# Patient Record
Sex: FEMALE | Race: WHITE | Employment: FULL TIME | URBAN - METROPOLITAN AREA
[De-identification: names, ages, dates, MRNs, and addresses within clinical notes are randomized per-mention and may not be internally consistent; named-entity substitution may affect disease eponyms.]

---

## 2024-10-04 ENCOUNTER — HOSPITAL ENCOUNTER (OUTPATIENT)
Facility: HOSPITAL | Age: 29
Discharge: HOME | End: 2024-10-07
Payer: OTHER GOVERNMENT

## 2024-10-04 VITALS
WEIGHT: 139.99 LBS | BODY MASS INDEX: 22.5 KG/M2 | OXYGEN SATURATION: 100 % | TEMPERATURE: 98.4 F | HEIGHT: 66 IN | HEART RATE: 81 BPM

## 2024-10-04 LAB
ALBUMIN SERPL-MCNC: 4.1 G/DL (ref 3.5–5)
ALBUMIN/GLOB SERPL: 1.5 (ref 1.1–2.2)
ALP SERPL-CCNC: 74 U/L (ref 45–117)
ALT SERPL-CCNC: 34 U/L (ref 12–78)
ANION GAP SERPL CALC-SCNC: 3 MMOL/L (ref 2–12)
AST SERPL-CCNC: 17 U/L (ref 15–37)
BASOPHILS # BLD: 0.1 K/UL (ref 0–0.1)
BASOPHILS NFR BLD: 1 % (ref 0–1)
BILIRUB SERPL-MCNC: 0.6 MG/DL (ref 0.2–1)
BUN SERPL-MCNC: 21 MG/DL (ref 6–20)
BUN/CREAT SERPL: 32 (ref 12–20)
CALCIUM SERPL-MCNC: 8.9 MG/DL (ref 8.5–10.1)
CHLORIDE SERPL-SCNC: 107 MMOL/L (ref 97–108)
CO2 SERPL-SCNC: 28 MMOL/L (ref 21–32)
CREAT SERPL-MCNC: 0.66 MG/DL (ref 0.55–1.02)
DIFFERENTIAL METHOD BLD: NORMAL
EOSINOPHIL # BLD: 0.2 K/UL (ref 0–0.4)
EOSINOPHIL NFR BLD: 2 % (ref 0–7)
ERYTHROCYTE [DISTWIDTH] IN BLOOD BY AUTOMATED COUNT: 12 % (ref 11.5–14.5)
EST. AVERAGE GLUCOSE BLD GHB EST-MCNC: 91 MG/DL
GLOBULIN SER CALC-MCNC: 2.8 G/DL (ref 2–4)
GLUCOSE SERPL-MCNC: 91 MG/DL (ref 65–100)
HBA1C MFR BLD: 4.8 % (ref 4–5.6)
HCT VFR BLD AUTO: 38.7 % (ref 35–47)
HGB BLD-MCNC: 13 G/DL (ref 11.5–16)
IMM GRANULOCYTES # BLD AUTO: 0 K/UL (ref 0–0.04)
IMM GRANULOCYTES NFR BLD AUTO: 0 % (ref 0–0.5)
LYMPHOCYTES # BLD: 2.3 K/UL (ref 0.8–3.5)
LYMPHOCYTES NFR BLD: 24 % (ref 12–49)
MCH RBC QN AUTO: 29.2 PG (ref 26–34)
MCHC RBC AUTO-ENTMCNC: 33.6 G/DL (ref 30–36.5)
MCV RBC AUTO: 87 FL (ref 80–99)
MONOCYTES # BLD: 0.6 K/UL (ref 0–1)
MONOCYTES NFR BLD: 6 % (ref 5–13)
NEUTS SEG # BLD: 6.4 K/UL (ref 1.8–8)
NEUTS SEG NFR BLD: 67 % (ref 32–75)
NRBC # BLD: 0 K/UL (ref 0–0.01)
NRBC BLD-RTO: 0 PER 100 WBC
PLATELET # BLD AUTO: 217 K/UL (ref 150–400)
PMV BLD AUTO: 11.3 FL (ref 8.9–12.9)
POTASSIUM SERPL-SCNC: 3.8 MMOL/L (ref 3.5–5.1)
PROT SERPL-MCNC: 6.9 G/DL (ref 6.4–8.2)
RBC # BLD AUTO: 4.45 M/UL (ref 3.8–5.2)
SODIUM SERPL-SCNC: 138 MMOL/L (ref 136–145)
WBC # BLD AUTO: 9.5 K/UL (ref 3.6–11)

## 2024-10-04 PROCEDURE — 36415 COLL VENOUS BLD VENIPUNCTURE: CPT

## 2024-10-04 PROCEDURE — 85025 COMPLETE CBC W/AUTO DIFF WBC: CPT

## 2024-10-04 PROCEDURE — 83036 HEMOGLOBIN GLYCOSYLATED A1C: CPT

## 2024-10-04 PROCEDURE — 80053 COMPREHEN METABOLIC PANEL: CPT

## 2024-10-04 RX ORDER — ACETAMINOPHEN 500 MG
500 TABLET ORAL EVERY 6 HOURS PRN
COMMUNITY

## 2024-10-04 RX ORDER — FLUVOXAMINE MALEATE 50 MG
50 TABLET ORAL NIGHTLY
COMMUNITY

## 2024-10-04 RX ORDER — HYOSCYAMINE SULFATE 0.125 MG
0.12 TABLET,DISINTEGRATING ORAL DAILY
COMMUNITY

## 2024-10-04 RX ORDER — GABAPENTIN 300 MG/1
300 CAPSULE ORAL 3 TIMES DAILY
COMMUNITY

## 2024-10-04 RX ORDER — BUPROPION HYDROCHLORIDE 100 MG/1
100 TABLET ORAL DAILY
COMMUNITY

## 2024-10-04 RX ORDER — IBUPROFEN 200 MG
200 TABLET ORAL EVERY 6 HOURS PRN
COMMUNITY

## 2024-10-04 RX ORDER — SPIRONOLACTONE 100 MG/1
200 TABLET, FILM COATED ORAL
COMMUNITY

## 2024-10-04 ASSESSMENT — PAIN DESCRIPTION - DESCRIPTORS: DESCRIPTORS: SHOOTING

## 2024-10-04 ASSESSMENT — PAIN DESCRIPTION - ORIENTATION: ORIENTATION: LOWER

## 2024-10-04 ASSESSMENT — PAIN SCALES - GENERAL: PAINLEVEL_OUTOF10: 6

## 2024-10-04 ASSESSMENT — PAIN DESCRIPTION - LOCATION: LOCATION: LEG

## 2024-10-04 NOTE — PERIOP NOTE
[address] [address]   MAIN OR                                  [phone]    MAIN PRE OP             [phone]                                                                                AMBULATORY PRE OP          [phone]  PRE-ADMISSION TESTING    [phone]     Surgery Date:  10/8/24       Is surgery arrival time given by surgeon? NO    If “NO”, [facility] staff will call you between 4 and 7pm the day before your surgery with your arrival time. (If your surgery is on a Monday, we will call you the Friday before.)    Call [phone] after 7pm Monday-Friday if you did not receive this call.    INSTRUCTIONS BEFORE YOUR SURGERY   When You  Arrive Arrive at [facility] Patient Access on 1st floor the day of your surgery.   Medications to   TAKE   Morning of Surgery MEDICATIONS TO TAKE THE MORNING OF SURGERY WITH A SIP OF WATER: WELLBUTRIN, GABAPENTIN    You may take these medications, IF NEEDED, the morning of surgery: NONE  Ask your surgeon/prescribing doctor for instructions on taking or stopping these medications prior to surgery: NONE   Medications to STOP  before surgery Non-Steroidal anti-inflammatory Drugs (NSAID's): for example, Diclofenac (Voltaren), Ibuprofen (Advil, Motrin), Naproxen (Aleve) 3 days  STOP all herbal supplements and vitamins(unless prescribed by your doctor), and fish oil for 7 days  Other: NONE  (Pain medications not listed above, including Tylenol may be taken up until 4 hours prior to arrival time)   Blood  Thinners If you take Aspirin, Plavix, Coumadin, or any blood-thinning or anti-blood clot medicine, talk to the doctor who prescribed the medications for pre-operative instructions.  If you take aspirin or aspirin containing products for pain, stop 7 days prior to surgery   Going Home - or Spending the Night OUTPATIENT SURGERY: You must have a responsible adult drive you home and stay with you 24 hours  information.   CHG may cause dry skin, but should not cause redness, rash or intense itching. If you suspect an allergic reaction, use your own soap for the morning of surgery and report reaction to the        pre-operative nurse.      Shower instructions  Shower the night before and the morning of surgery using these instructions:  1. Wash your hair using your normal shampoo and rinse.  2. Wash your face and genital area using your own soap and rinse.  3. Turn off the shower water and pour half of the bottle of CHG onto a clean washcloth.  4. Wash your body from neck down to toes. Pay special attention to your surgical site.  5. Do not shave near the surgical site.  6. Turn shower water back on and rinse off.  7. Pat dry with a clean towel, sleep in clean clothes and clean sheets.  8. Repeat these steps the morning of surgery.    Do NOT use any powder, deodorant, lotion/cream/oil, perfume/aftershave, cosmetics or alcohol-based skin or hair products after showering.    Do NOT shave your surgical site less than four days prior to surgery.    Brush your teeth and rinse with water before coming to the hospital.       Tips To Help Prevent Infections After Your Surgery   Wash your hands frequently.   Keep your bandage clean and dry.   Do not touch your surgical site.   Use clean towels and washcloths.   Wear clean clothes and use clean bed linens.   Do not allow pets near your surgical wound.   Quit smoking to help wound healing.   Manage blood sugar levels if you have diabetes. Poorly managed blood sugar levels slow down wound healing.      How We Keep You Safe During Surgery And Work To Prevent Surgical Site Infections  1. Caregivers will check your ID bracelet multiple times.  2. All caregivers will clean their hands before touching you.  3. Caregivers will verify the procedure and surgical site multiple times with you. Do not priscila your surgical site unless instructed to by your surgeon.  4. Your surgical site will be

## 2024-10-05 LAB
BACTERIA SPEC CULT: NORMAL
BACTERIA SPEC CULT: NORMAL
SERVICE CMNT-IMP: NORMAL

## 2024-10-07 ENCOUNTER — ANESTHESIA EVENT (OUTPATIENT)
Facility: HOSPITAL | Age: 29
End: 2024-10-07
Payer: OTHER GOVERNMENT

## 2024-10-07 RX ORDER — MUPIROCIN 20 MG/G
OINTMENT TOPICAL 2 TIMES DAILY
Status: CANCELLED | OUTPATIENT
Start: 2024-10-07 | End: 2024-10-12

## 2024-10-07 NOTE — PERIOP NOTE
PAT Nurse Practitioner   Pre-Operative Chart Review/Assessment:-ORTHOPEDIC/NEUROSURGICAL SPINE                Patient Name:  Natali Lares                                                           Age:   28 y.o.    :  1995     Today's Date:  10/7/2024     Date of PAT:   10/4/24      Date of Surgery:    10/8/24      Procedure(s):  L4 - L5 LAMINECTOMY AND DISCECTOMY; L5 - S1 LAMINOTOMY AND DISCECTOMY      Surgeon:   Dr. Moran                       PLAN:       1)  PCP: Althea Pires DO        2)  Cardiac Clearance: Not requested.       3)  Diabetic Treatment Consult: Not indicated. A1c-4.8       4)  Sleep Apnea evaluation:  Not indicated. MARY Score 0.       5)  Treatment for MRSA/Staph Aureus: +MSSA. Tx w/ mupirocin(ordered for inpt)       6)   Discharge Plan: Home w/ spouse       7)  Skin: Denies open wounds, cuts, sores, rashes or other areas of concern in PAT assessment.       8)  Additional Concerns: OCD, fibromyalgia     Additional Orders for Day of Surgery:  Urine HCG      Records Scanned in Epic:   None              Vital Signs:         Vitals:    10/04/24 1358   Pulse: 81   Temp: 98.4 °F (36.9 °C)   SpO2: 100%           Body mass index is 22.6 kg/m².       ____________________________________________  PAST MEDICAL HISTORY  Past Medical History:   Diagnosis Date    Fibromyalgia     OCD (obsessive compulsive disorder)       ____________________________________________  PAST SURGICAL HISTORY  Past Surgical History:   Procedure Laterality Date    CHOLECYSTECTOMY  2019    DENTAL SURGERY  2020    SEPTORHINOPLASTY      X2.   AND 2024     ____________________________________________  HOME MEDICATIONS    Current Outpatient Medications   Medication Sig    gabapentin (NEURONTIN) 300 MG capsule Take 1 capsule by mouth 3 times daily. Max Daily Amount: 900 mg    acetaminophen (TYLENOL) 500 MG tablet Take 1 tablet by mouth every 6 hours as needed for Pain    ibuprofen (ADVIL;MOTRIN) 200 MG tablet Take 1

## 2024-10-08 ENCOUNTER — APPOINTMENT (OUTPATIENT)
Facility: HOSPITAL | Age: 29
End: 2024-10-08
Attending: NEUROLOGICAL SURGERY
Payer: OTHER GOVERNMENT

## 2024-10-08 ENCOUNTER — HOSPITAL ENCOUNTER (OUTPATIENT)
Facility: HOSPITAL | Age: 29
Setting detail: OBSERVATION
LOS: 1 days | Discharge: HOME | End: 2024-10-09
Attending: NEUROLOGICAL SURGERY | Admitting: NEUROLOGICAL SURGERY
Payer: OTHER GOVERNMENT

## 2024-10-08 ENCOUNTER — ANESTHESIA (OUTPATIENT)
Facility: HOSPITAL | Age: 29
End: 2024-10-08
Payer: OTHER GOVERNMENT

## 2024-10-08 DIAGNOSIS — Z98.890 S/P LAMINECTOMY: Primary | ICD-10-CM

## 2024-10-08 PROBLEM — M51.26 HERNIATED LUMBAR INTERVERTEBRAL DISC: Status: ACTIVE | Noted: 2024-10-08

## 2024-10-08 LAB
ABO + RH BLD: NORMAL
BLOOD GROUP ANTIBODIES SERPL: NORMAL
HCG UR QL: NEGATIVE
SPECIMEN EXP DATE BLD: NORMAL

## 2024-10-08 PROCEDURE — 86850 RBC ANTIBODY SCREEN: CPT

## 2024-10-08 PROCEDURE — G0378 HOSPITAL OBSERVATION PER HR: HCPCS

## 2024-10-08 PROCEDURE — 3700000001 HC ADD 15 MINUTES (ANESTHESIA): Performed by: NEUROLOGICAL SURGERY

## 2024-10-08 PROCEDURE — 7100000000 HC PACU RECOVERY - FIRST 15 MIN: Performed by: NEUROLOGICAL SURGERY

## 2024-10-08 PROCEDURE — 6360000002 HC RX W HCPCS: Performed by: NURSE ANESTHETIST, CERTIFIED REGISTERED

## 2024-10-08 PROCEDURE — 3600000014 HC SURGERY LEVEL 4 ADDTL 15MIN: Performed by: NEUROLOGICAL SURGERY

## 2024-10-08 PROCEDURE — 6370000000 HC RX 637 (ALT 250 FOR IP): Performed by: NEUROLOGICAL SURGERY

## 2024-10-08 PROCEDURE — 6370000000 HC RX 637 (ALT 250 FOR IP): Performed by: ANESTHESIOLOGY

## 2024-10-08 PROCEDURE — 2580000003 HC RX 258: Performed by: NEUROLOGICAL SURGERY

## 2024-10-08 PROCEDURE — 2580000003 HC RX 258: Performed by: ANESTHESIOLOGY

## 2024-10-08 PROCEDURE — 6360000002 HC RX W HCPCS: Performed by: NEUROLOGICAL SURGERY

## 2024-10-08 PROCEDURE — 7100000001 HC PACU RECOVERY - ADDTL 15 MIN: Performed by: NEUROLOGICAL SURGERY

## 2024-10-08 PROCEDURE — 2500000003 HC RX 250 WO HCPCS: Performed by: NEUROLOGICAL SURGERY

## 2024-10-08 PROCEDURE — 2709999900 HC NON-CHARGEABLE SUPPLY: Performed by: NEUROLOGICAL SURGERY

## 2024-10-08 PROCEDURE — 2720000010 HC SURG SUPPLY STERILE: Performed by: NEUROLOGICAL SURGERY

## 2024-10-08 PROCEDURE — 99024 POSTOP FOLLOW-UP VISIT: CPT | Performed by: PHYSICIAN ASSISTANT

## 2024-10-08 PROCEDURE — 97530 THERAPEUTIC ACTIVITIES: CPT

## 2024-10-08 PROCEDURE — 86901 BLOOD TYPING SEROLOGIC RH(D): CPT

## 2024-10-08 PROCEDURE — 6370000000 HC RX 637 (ALT 250 FOR IP): Performed by: PHYSICIAN ASSISTANT

## 2024-10-08 PROCEDURE — 2580000003 HC RX 258: Performed by: NURSE ANESTHETIST, CERTIFIED REGISTERED

## 2024-10-08 PROCEDURE — 2500000003 HC RX 250 WO HCPCS: Performed by: NURSE ANESTHETIST, CERTIFIED REGISTERED

## 2024-10-08 PROCEDURE — 81025 URINE PREGNANCY TEST: CPT

## 2024-10-08 PROCEDURE — C1729 CATH, DRAINAGE: HCPCS | Performed by: NEUROLOGICAL SURGERY

## 2024-10-08 PROCEDURE — 3600000004 HC SURGERY LEVEL 4 BASE: Performed by: NEUROLOGICAL SURGERY

## 2024-10-08 PROCEDURE — 97161 PT EVAL LOW COMPLEX 20 MIN: CPT

## 2024-10-08 PROCEDURE — 3700000000 HC ANESTHESIA ATTENDED CARE: Performed by: NEUROLOGICAL SURGERY

## 2024-10-08 PROCEDURE — 36415 COLL VENOUS BLD VENIPUNCTURE: CPT

## 2024-10-08 PROCEDURE — 86900 BLOOD TYPING SEROLOGIC ABO: CPT

## 2024-10-08 PROCEDURE — C1713 ANCHOR/SCREW BN/BN,TIS/BN: HCPCS | Performed by: NEUROLOGICAL SURGERY

## 2024-10-08 RX ORDER — HYDROMORPHONE HYDROCHLORIDE 2 MG/1
1 TABLET ORAL EVERY 4 HOURS PRN
Status: DISCONTINUED | OUTPATIENT
Start: 2024-10-08 | End: 2024-10-09 | Stop reason: HOSPADM

## 2024-10-08 RX ORDER — HYDRALAZINE HYDROCHLORIDE 20 MG/ML
10 INJECTION INTRAMUSCULAR; INTRAVENOUS ONCE
Status: DISCONTINUED | OUTPATIENT
Start: 2024-10-08 | End: 2024-10-08 | Stop reason: HOSPADM

## 2024-10-08 RX ORDER — HYDROMORPHONE HYDROCHLORIDE 2 MG/1
2 TABLET ORAL EVERY 4 HOURS PRN
Status: DISCONTINUED | OUTPATIENT
Start: 2024-10-08 | End: 2024-10-09 | Stop reason: HOSPADM

## 2024-10-08 RX ORDER — HYDROMORPHONE HYDROCHLORIDE 2 MG/ML
INJECTION, SOLUTION INTRAMUSCULAR; INTRAVENOUS; SUBCUTANEOUS
Status: DISCONTINUED | OUTPATIENT
Start: 2024-10-08 | End: 2024-10-08 | Stop reason: SDUPTHER

## 2024-10-08 RX ORDER — MIDAZOLAM HYDROCHLORIDE 1 MG/ML
INJECTION INTRAMUSCULAR; INTRAVENOUS
Status: DISCONTINUED | OUTPATIENT
Start: 2024-10-08 | End: 2024-10-08 | Stop reason: SDUPTHER

## 2024-10-08 RX ORDER — SPIRONOLACTONE 100 MG/1
200 TABLET, FILM COATED ORAL
Status: DISCONTINUED | OUTPATIENT
Start: 2024-10-08 | End: 2024-10-09 | Stop reason: HOSPADM

## 2024-10-08 RX ORDER — ONDANSETRON 2 MG/ML
4 INJECTION INTRAMUSCULAR; INTRAVENOUS EVERY 6 HOURS PRN
Status: DISCONTINUED | OUTPATIENT
Start: 2024-10-08 | End: 2024-10-08 | Stop reason: SDUPTHER

## 2024-10-08 RX ORDER — LIDOCAINE HYDROCHLORIDE 20 MG/ML
INJECTION, SOLUTION EPIDURAL; INFILTRATION; INTRACAUDAL; PERINEURAL
Status: DISCONTINUED | OUTPATIENT
Start: 2024-10-08 | End: 2024-10-08 | Stop reason: SDUPTHER

## 2024-10-08 RX ORDER — ONDANSETRON 2 MG/ML
4 INJECTION INTRAMUSCULAR; INTRAVENOUS EVERY 6 HOURS PRN
Status: DISCONTINUED | OUTPATIENT
Start: 2024-10-08 | End: 2024-10-09 | Stop reason: HOSPADM

## 2024-10-08 RX ORDER — ONDANSETRON 2 MG/ML
4 INJECTION INTRAMUSCULAR; INTRAVENOUS
Status: DISCONTINUED | OUTPATIENT
Start: 2024-10-08 | End: 2024-10-08 | Stop reason: HOSPADM

## 2024-10-08 RX ORDER — SODIUM CHLORIDE, SODIUM LACTATE, POTASSIUM CHLORIDE, CALCIUM CHLORIDE 600; 310; 30; 20 MG/100ML; MG/100ML; MG/100ML; MG/100ML
INJECTION, SOLUTION INTRAVENOUS
Status: DISCONTINUED | OUTPATIENT
Start: 2024-10-08 | End: 2024-10-08 | Stop reason: SDUPTHER

## 2024-10-08 RX ORDER — SODIUM CHLORIDE 9 MG/ML
INJECTION, SOLUTION INTRAVENOUS PRN
Status: DISCONTINUED | OUTPATIENT
Start: 2024-10-08 | End: 2024-10-08

## 2024-10-08 RX ORDER — ACETAMINOPHEN 325 MG/1
650 TABLET ORAL EVERY 6 HOURS
Status: DISCONTINUED | OUTPATIENT
Start: 2024-10-08 | End: 2024-10-08 | Stop reason: SDUPTHER

## 2024-10-08 RX ORDER — POLYETHYLENE GLYCOL 3350 17 G/17G
17 POWDER, FOR SOLUTION ORAL DAILY PRN
Status: DISCONTINUED | OUTPATIENT
Start: 2024-10-08 | End: 2024-10-09 | Stop reason: HOSPADM

## 2024-10-08 RX ORDER — DIPHENHYDRAMINE HYDROCHLORIDE 50 MG/ML
25 INJECTION INTRAMUSCULAR; INTRAVENOUS EVERY 6 HOURS PRN
Status: DISCONTINUED | OUTPATIENT
Start: 2024-10-08 | End: 2024-10-09 | Stop reason: HOSPADM

## 2024-10-08 RX ORDER — LIDOCAINE HYDROCHLORIDE AND EPINEPHRINE 10; 10 MG/ML; UG/ML
INJECTION, SOLUTION INFILTRATION; PERINEURAL PRN
Status: DISCONTINUED | OUTPATIENT
Start: 2024-10-08 | End: 2024-10-08 | Stop reason: HOSPADM

## 2024-10-08 RX ORDER — BISACODYL 10 MG
10 SUPPOSITORY, RECTAL RECTAL DAILY PRN
Status: DISCONTINUED | OUTPATIENT
Start: 2024-10-08 | End: 2024-10-09 | Stop reason: HOSPADM

## 2024-10-08 RX ORDER — LIDOCAINE HYDROCHLORIDE 10 MG/ML
1 INJECTION, SOLUTION EPIDURAL; INFILTRATION; INTRACAUDAL; PERINEURAL
Status: DISCONTINUED | OUTPATIENT
Start: 2024-10-08 | End: 2024-10-08 | Stop reason: HOSPADM

## 2024-10-08 RX ORDER — FENTANYL CITRATE 50 UG/ML
100 INJECTION, SOLUTION INTRAMUSCULAR; INTRAVENOUS
Status: DISCONTINUED | OUTPATIENT
Start: 2024-10-08 | End: 2024-10-08 | Stop reason: HOSPADM

## 2024-10-08 RX ORDER — EPHEDRINE SULFATE 50 MG/ML
INJECTION INTRAVENOUS
Status: DISCONTINUED | OUTPATIENT
Start: 2024-10-08 | End: 2024-10-08 | Stop reason: SDUPTHER

## 2024-10-08 RX ORDER — SODIUM CHLORIDE 0.9 % (FLUSH) 0.9 %
5-40 SYRINGE (ML) INJECTION EVERY 12 HOURS SCHEDULED
Status: DISCONTINUED | OUTPATIENT
Start: 2024-10-08 | End: 2024-10-09 | Stop reason: HOSPADM

## 2024-10-08 RX ORDER — ACETAMINOPHEN 500 MG
1000 TABLET ORAL ONCE
Status: COMPLETED | OUTPATIENT
Start: 2024-10-08 | End: 2024-10-08

## 2024-10-08 RX ORDER — MUPIROCIN 20 MG/G
OINTMENT TOPICAL 2 TIMES DAILY
Status: DISCONTINUED | OUTPATIENT
Start: 2024-10-08 | End: 2024-10-09 | Stop reason: HOSPADM

## 2024-10-08 RX ORDER — METHOCARBAMOL 750 MG/1
750 TABLET, FILM COATED ORAL EVERY 8 HOURS PRN
Status: DISCONTINUED | OUTPATIENT
Start: 2024-10-08 | End: 2024-10-09 | Stop reason: HOSPADM

## 2024-10-08 RX ORDER — HYDROMORPHONE HYDROCHLORIDE 1 MG/ML
0.5 INJECTION, SOLUTION INTRAMUSCULAR; INTRAVENOUS; SUBCUTANEOUS EVERY 5 MIN PRN
Status: DISCONTINUED | OUTPATIENT
Start: 2024-10-08 | End: 2024-10-08 | Stop reason: HOSPADM

## 2024-10-08 RX ORDER — SODIUM CHLORIDE 9 MG/ML
INJECTION, SOLUTION INTRAVENOUS PRN
Status: DISCONTINUED | OUTPATIENT
Start: 2024-10-08 | End: 2024-10-09 | Stop reason: HOSPADM

## 2024-10-08 RX ORDER — SODIUM CHLORIDE 0.9 % (FLUSH) 0.9 %
5-40 SYRINGE (ML) INJECTION PRN
Status: DISCONTINUED | OUTPATIENT
Start: 2024-10-08 | End: 2024-10-09 | Stop reason: HOSPADM

## 2024-10-08 RX ORDER — PROCHLORPERAZINE EDISYLATE 5 MG/ML
5 INJECTION INTRAMUSCULAR; INTRAVENOUS
Status: DISCONTINUED | OUTPATIENT
Start: 2024-10-08 | End: 2024-10-08 | Stop reason: HOSPADM

## 2024-10-08 RX ORDER — SODIUM CHLORIDE 0.9 % (FLUSH) 0.9 %
5-40 SYRINGE (ML) INJECTION PRN
Status: DISCONTINUED | OUTPATIENT
Start: 2024-10-08 | End: 2024-10-08 | Stop reason: HOSPADM

## 2024-10-08 RX ORDER — SENNA AND DOCUSATE SODIUM 50; 8.6 MG/1; MG/1
1 TABLET, FILM COATED ORAL 2 TIMES DAILY
Status: DISCONTINUED | OUTPATIENT
Start: 2024-10-08 | End: 2024-10-09 | Stop reason: HOSPADM

## 2024-10-08 RX ORDER — OXYCODONE HYDROCHLORIDE 5 MG/1
5 TABLET ORAL
Status: DISCONTINUED | OUTPATIENT
Start: 2024-10-08 | End: 2024-10-08 | Stop reason: HOSPADM

## 2024-10-08 RX ORDER — ONDANSETRON 4 MG/1
4 TABLET, ORALLY DISINTEGRATING ORAL EVERY 8 HOURS PRN
Status: DISCONTINUED | OUTPATIENT
Start: 2024-10-08 | End: 2024-10-09 | Stop reason: HOSPADM

## 2024-10-08 RX ORDER — OXYCODONE HYDROCHLORIDE 5 MG/1
5 TABLET ORAL EVERY 4 HOURS PRN
Status: DISCONTINUED | OUTPATIENT
Start: 2024-10-08 | End: 2024-10-09 | Stop reason: HOSPADM

## 2024-10-08 RX ORDER — NALOXONE HYDROCHLORIDE 0.4 MG/ML
INJECTION, SOLUTION INTRAMUSCULAR; INTRAVENOUS; SUBCUTANEOUS PRN
Status: DISCONTINUED | OUTPATIENT
Start: 2024-10-08 | End: 2024-10-08

## 2024-10-08 RX ORDER — SODIUM CHLORIDE 0.9 % (FLUSH) 0.9 %
5-40 SYRINGE (ML) INJECTION PRN
Status: DISCONTINUED | OUTPATIENT
Start: 2024-10-08 | End: 2024-10-08

## 2024-10-08 RX ORDER — SODIUM CHLORIDE 0.9 % (FLUSH) 0.9 %
5-40 SYRINGE (ML) INJECTION EVERY 12 HOURS SCHEDULED
Status: DISCONTINUED | OUTPATIENT
Start: 2024-10-08 | End: 2024-10-08

## 2024-10-08 RX ORDER — MIDAZOLAM HYDROCHLORIDE 2 MG/2ML
2 INJECTION, SOLUTION INTRAMUSCULAR; INTRAVENOUS PRN
Status: DISCONTINUED | OUTPATIENT
Start: 2024-10-08 | End: 2024-10-08 | Stop reason: HOSPADM

## 2024-10-08 RX ORDER — ONDANSETRON 2 MG/ML
INJECTION INTRAMUSCULAR; INTRAVENOUS
Status: DISCONTINUED | OUTPATIENT
Start: 2024-10-08 | End: 2024-10-08 | Stop reason: SDUPTHER

## 2024-10-08 RX ORDER — BUPROPION HYDROCHLORIDE 100 MG/1
100 TABLET, EXTENDED RELEASE ORAL DAILY
Status: DISCONTINUED | OUTPATIENT
Start: 2024-10-09 | End: 2024-10-09 | Stop reason: HOSPADM

## 2024-10-08 RX ORDER — ROCURONIUM BROMIDE 10 MG/ML
INJECTION, SOLUTION INTRAVENOUS
Status: DISCONTINUED | OUTPATIENT
Start: 2024-10-08 | End: 2024-10-08 | Stop reason: SDUPTHER

## 2024-10-08 RX ORDER — SODIUM CHLORIDE, SODIUM LACTATE, POTASSIUM CHLORIDE, CALCIUM CHLORIDE 600; 310; 30; 20 MG/100ML; MG/100ML; MG/100ML; MG/100ML
INJECTION, SOLUTION INTRAVENOUS CONTINUOUS
Status: DISCONTINUED | OUTPATIENT
Start: 2024-10-08 | End: 2024-10-08 | Stop reason: HOSPADM

## 2024-10-08 RX ORDER — FENTANYL CITRATE 50 UG/ML
INJECTION, SOLUTION INTRAMUSCULAR; INTRAVENOUS
Status: DISCONTINUED | OUTPATIENT
Start: 2024-10-08 | End: 2024-10-08 | Stop reason: SDUPTHER

## 2024-10-08 RX ORDER — DEXAMETHASONE SODIUM PHOSPHATE 4 MG/ML
INJECTION, SOLUTION INTRA-ARTICULAR; INTRALESIONAL; INTRAMUSCULAR; INTRAVENOUS; SOFT TISSUE
Status: DISCONTINUED | OUTPATIENT
Start: 2024-10-08 | End: 2024-10-08 | Stop reason: SDUPTHER

## 2024-10-08 RX ORDER — SODIUM CHLORIDE 9 MG/ML
INJECTION, SOLUTION INTRAVENOUS CONTINUOUS
Status: DISCONTINUED | OUTPATIENT
Start: 2024-10-08 | End: 2024-10-09 | Stop reason: HOSPADM

## 2024-10-08 RX ORDER — ONDANSETRON 4 MG/1
4 TABLET, ORALLY DISINTEGRATING ORAL EVERY 8 HOURS PRN
Status: DISCONTINUED | OUTPATIENT
Start: 2024-10-08 | End: 2024-10-08 | Stop reason: SDUPTHER

## 2024-10-08 RX ORDER — SODIUM CHLORIDE 0.9 % (FLUSH) 0.9 %
5-40 SYRINGE (ML) INJECTION EVERY 12 HOURS SCHEDULED
Status: DISCONTINUED | OUTPATIENT
Start: 2024-10-08 | End: 2024-10-08 | Stop reason: HOSPADM

## 2024-10-08 RX ORDER — FLUVOXAMINE MALEATE 50 MG
50 TABLET ORAL NIGHTLY
Status: DISCONTINUED | OUTPATIENT
Start: 2024-10-08 | End: 2024-10-09 | Stop reason: HOSPADM

## 2024-10-08 RX ORDER — HYDROXYZINE HYDROCHLORIDE 10 MG/1
10 TABLET, FILM COATED ORAL EVERY 8 HOURS PRN
Status: DISCONTINUED | OUTPATIENT
Start: 2024-10-08 | End: 2024-10-09 | Stop reason: HOSPADM

## 2024-10-08 RX ORDER — ACETAMINOPHEN 325 MG/1
650 TABLET ORAL EVERY 6 HOURS
Status: DISCONTINUED | OUTPATIENT
Start: 2024-10-08 | End: 2024-10-09 | Stop reason: HOSPADM

## 2024-10-08 RX ORDER — GABAPENTIN 300 MG/1
300 CAPSULE ORAL 3 TIMES DAILY
Status: DISCONTINUED | OUTPATIENT
Start: 2024-10-08 | End: 2024-10-09 | Stop reason: HOSPADM

## 2024-10-08 RX ORDER — SUCCINYLCHOLINE/SOD CL,ISO/PF 200MG/10ML
SYRINGE (ML) INTRAVENOUS
Status: DISCONTINUED | OUTPATIENT
Start: 2024-10-08 | End: 2024-10-08 | Stop reason: SDUPTHER

## 2024-10-08 RX ORDER — SODIUM CHLORIDE 9 MG/ML
INJECTION, SOLUTION INTRAVENOUS PRN
Status: DISCONTINUED | OUTPATIENT
Start: 2024-10-08 | End: 2024-10-08 | Stop reason: HOSPADM

## 2024-10-08 RX ORDER — FENTANYL CITRATE 50 UG/ML
25 INJECTION, SOLUTION INTRAMUSCULAR; INTRAVENOUS EVERY 5 MIN PRN
Status: DISCONTINUED | OUTPATIENT
Start: 2024-10-08 | End: 2024-10-08 | Stop reason: HOSPADM

## 2024-10-08 RX ADMIN — ROCURONIUM BROMIDE 5 MG: 10 INJECTION, SOLUTION INTRAVENOUS at 08:13

## 2024-10-08 RX ADMIN — ACETAMINOPHEN 650 MG: 325 TABLET ORAL at 14:15

## 2024-10-08 RX ADMIN — ACETAMINOPHEN 1000 MG: 500 TABLET ORAL at 07:16

## 2024-10-08 RX ADMIN — ROCURONIUM BROMIDE 35 MG: 10 INJECTION, SOLUTION INTRAVENOUS at 08:22

## 2024-10-08 RX ADMIN — SODIUM CHLORIDE, POTASSIUM CHLORIDE, SODIUM LACTATE AND CALCIUM CHLORIDE: 600; 310; 30; 20 INJECTION, SOLUTION INTRAVENOUS at 08:02

## 2024-10-08 RX ADMIN — WATER 2000 MG: 1 INJECTION INTRAMUSCULAR; INTRAVENOUS; SUBCUTANEOUS at 08:26

## 2024-10-08 RX ADMIN — DEXAMETHASONE SODIUM PHOSPHATE 8 MG: 4 INJECTION INTRA-ARTICULAR; INTRALESIONAL; INTRAMUSCULAR; INTRAVENOUS; SOFT TISSUE at 08:32

## 2024-10-08 RX ADMIN — OXYCODONE 5 MG: 5 TABLET ORAL at 21:30

## 2024-10-08 RX ADMIN — FLUVOXAMINE MALEATE 50 MG: 50 TABLET, COATED ORAL at 22:30

## 2024-10-08 RX ADMIN — PROPOFOL 140 MG: 10 INJECTION, EMULSION INTRAVENOUS at 08:14

## 2024-10-08 RX ADMIN — ONDANSETRON 4 MG: 2 INJECTION INTRAMUSCULAR; INTRAVENOUS at 09:38

## 2024-10-08 RX ADMIN — SENNOSIDES AND DOCUSATE SODIUM 1 TABLET: 50; 8.6 TABLET ORAL at 22:30

## 2024-10-08 RX ADMIN — GABAPENTIN 300 MG: 300 CAPSULE ORAL at 22:29

## 2024-10-08 RX ADMIN — FENTANYL CITRATE 50 MCG: 50 INJECTION, SOLUTION INTRAMUSCULAR; INTRAVENOUS at 08:14

## 2024-10-08 RX ADMIN — MIDAZOLAM 2 MG: 1 INJECTION INTRAMUSCULAR; INTRAVENOUS at 08:08

## 2024-10-08 RX ADMIN — EPHEDRINE SULFATE 10 MG: 50 INJECTION INTRAVENOUS at 09:19

## 2024-10-08 RX ADMIN — Medication 120 MG: at 08:14

## 2024-10-08 RX ADMIN — SODIUM CHLORIDE: 9 INJECTION, SOLUTION INTRAVENOUS at 22:26

## 2024-10-08 RX ADMIN — OXYCODONE 5 MG: 5 TABLET ORAL at 16:49

## 2024-10-08 RX ADMIN — WATER 2000 MG: 1 INJECTION INTRAMUSCULAR; INTRAVENOUS; SUBCUTANEOUS at 16:20

## 2024-10-08 RX ADMIN — FENTANYL CITRATE 50 MCG: 50 INJECTION, SOLUTION INTRAMUSCULAR; INTRAVENOUS at 08:37

## 2024-10-08 RX ADMIN — SENNOSIDES AND DOCUSATE SODIUM 1 TABLET: 50; 8.6 TABLET ORAL at 14:15

## 2024-10-08 RX ADMIN — SODIUM CHLORIDE, POTASSIUM CHLORIDE, SODIUM LACTATE AND CALCIUM CHLORIDE: 600; 310; 30; 20 INJECTION, SOLUTION INTRAVENOUS at 09:36

## 2024-10-08 RX ADMIN — HYDROMORPHONE HYDROCHLORIDE 1 MG: 2 INJECTION, SOLUTION INTRAMUSCULAR; INTRAVENOUS; SUBCUTANEOUS at 09:14

## 2024-10-08 RX ADMIN — MIDAZOLAM 3 MG: 1 INJECTION INTRAMUSCULAR; INTRAVENOUS at 08:06

## 2024-10-08 RX ADMIN — EPHEDRINE SULFATE 10 MG: 50 INJECTION INTRAVENOUS at 09:40

## 2024-10-08 RX ADMIN — SODIUM CHLORIDE, PRESERVATIVE FREE 10 ML: 5 INJECTION INTRAVENOUS at 22:31

## 2024-10-08 RX ADMIN — SODIUM CHLORIDE, POTASSIUM CHLORIDE, SODIUM LACTATE AND CALCIUM CHLORIDE: 600; 310; 30; 20 INJECTION, SOLUTION INTRAVENOUS at 07:27

## 2024-10-08 RX ADMIN — SUGAMMADEX 200 MG: 100 INJECTION, SOLUTION INTRAVENOUS at 09:54

## 2024-10-08 RX ADMIN — LIDOCAINE HYDROCHLORIDE 60 MG: 20 INJECTION, SOLUTION EPIDURAL; INFILTRATION; INTRACAUDAL; PERINEURAL at 08:14

## 2024-10-08 RX ADMIN — ACETAMINOPHEN 650 MG: 325 TABLET ORAL at 22:30

## 2024-10-08 RX ADMIN — HYDROMORPHONE HYDROCHLORIDE 0.5 MG: 2 INJECTION, SOLUTION INTRAMUSCULAR; INTRAVENOUS; SUBCUTANEOUS at 10:06

## 2024-10-08 RX ADMIN — HYOSCYAMINE SULFATE 0.12 MG: 0.12 TABLET SUBLINGUAL at 14:15

## 2024-10-08 RX ADMIN — SODIUM CHLORIDE: 9 INJECTION, SOLUTION INTRAVENOUS at 14:14

## 2024-10-08 RX ADMIN — HYDROMORPHONE HYDROCHLORIDE 0.5 MG: 2 INJECTION, SOLUTION INTRAMUSCULAR; INTRAVENOUS; SUBCUTANEOUS at 10:14

## 2024-10-08 ASSESSMENT — PAIN SCALES - GENERAL
PAINLEVEL_OUTOF10: 4
PAINLEVEL_OUTOF10: 6
PAINLEVEL_OUTOF10: 6

## 2024-10-08 ASSESSMENT — PAIN - FUNCTIONAL ASSESSMENT
PAIN_FUNCTIONAL_ASSESSMENT: 0-10
PAIN_FUNCTIONAL_ASSESSMENT: ACTIVITIES ARE NOT PREVENTED

## 2024-10-08 ASSESSMENT — PAIN DESCRIPTION - ORIENTATION
ORIENTATION: POSTERIOR;LOWER
ORIENTATION: POSTERIOR

## 2024-10-08 ASSESSMENT — PAIN DESCRIPTION - DESCRIPTORS
DESCRIPTORS: STABBING
DESCRIPTORS: ACHING
DESCRIPTORS: SORE

## 2024-10-08 ASSESSMENT — PAIN DESCRIPTION - LOCATION
LOCATION: BACK

## 2024-10-08 NOTE — PERIOP NOTE
TRANSFER - OUT REPORT:    Verbal report given to JENNIFER Martinez(name) on Natali Lares  being transferred to 545(unit) for routine post-op       Report consisted of patient’s Situation, Background, Assessment and   Recommendations(SBAR).     Time Pre op antibiotic given:0826  Anesthesia Stop time: 1019    Information from the following report(s) SBAR, OR Summary, Procedure Summary, Intake/Output, MAR, and Cardiac Rhythm NSR/Sinus Tachy  was reviewed with the receiving nurse.    Opportunity for questions and clarification was provided.     Is the patient on 02? No       L/Min 0       Other n/a    Is the patient on a monitor? No    Is the nurse transporting with the patient? No    At transfer, are there Patient Belongings with the patient?  If Yes, please note/list:    Surgical Waiting Area notified of patient's transfer from PACU? Yes

## 2024-10-08 NOTE — OP NOTE
[address]  [address]                            OPERATIVE REPORT      PATIENT NAME: SHARLENE WINKLER                 : 1995  MED REC NO: [id number]                       ROOM: OR  ACCOUNT NO: [id number]                       ADMIT DATE: 10/08/2024  PROVIDER: Maria Esther Moran MD    DATE OF SERVICE:  10/08/2024    PREOPERATIVE DIAGNOSES:  Large herniated nucleus pulposus at L4-5, herniated nucleus pulposus, left L5-S1.    POSTOPERATIVE DIAGNOSES:  Large herniated nucleus pulposus at L4-5, herniated nucleus pulposus, left L5-S1.    PROCEDURES PERFORMED:  L4-5 laminectomy and diskectomy using operating microscope, L5-S1 laminotomy and diskectomy on the left using operating microscope.    SURGEON:  Maria Esther Moran MD    ASSISTANT:  Davide Machado.    ANESTHESIA:  General.    ESTIMATED BLOOD LOSS:  30 mL.    SPECIMENS REMOVED:  None.    INTRAOPERATIVE FINDINGS:  Very large disk herniation at L4-5 causing bilateral compression, good decompression centrally and traversing nerve roots.  When I finished, a smaller disk herniation causing significant left S1 nerve compression at the L5-S1 level.  Good decompression when I finished.     COMPLICATIONS:  None.    IMPLANTS:  None.    INDICATIONS:  This is a 28-year-old woman, who has had increasing back pain and bilateral lower extremity pain.  When I first saw her, it was predominantly on the left side and just checking on the right.  She had significant weakness, tibialis anterior, gastrocnemius, and EHL, particularly on the left.  Her symptoms have progressed over the course of the past several weeks to include severe pain bilaterally with significant weakness.  She cannot stand or sit for any significant time and MRI showed a very large central disk herniation at L4-5.  She did not have any signs of cauda equina syndrome with the bowel and bladder intact and no perineal numbness.  Risks and benefits

## 2024-10-08 NOTE — BRIEF OP NOTE
Brief Postoperative Note      Patient: Natali Lares  YOB: 1995  MRN: [id number]    Date of Procedure: 10/8/2024    Pre-Op Diagnosis Codes:      * Herniated nucleus pulposus, L4-5 [M51.26]     * Herniated nucleus pulposus, L5-S1 [M51.27]    Post-Op Diagnosis: Same       Procedure(s):  L4 - L5 LAMINECTOMY AND DISCECTOMY; L5 - S1 LAMINOTOMY AND DISCECTOMY    Surgeon(s):  Maria Esther Moran MD    Assistant:  Surgical Assistant: Davide Machado    Anesthesia: General    Estimated Blood Loss (mL): 30cc    Complications: None    Specimens:   * No specimens in log *    Implants:  * No implants in log *      Drains:   Closed/Suction Drain Medial Back Accordion (Active)       Findings:  Infection Present At Time Of Surgery (PATOS) (choose all levels that have infection present):  No infection present  Other Findings: large disc herniation at L4/5, good decompression of traversing nerve roots, smaller disc herniation compression L5/S1 level.  Good decompression when I finished.      Electronically signed by Maria Esther Moran MD on 10/8/2024 at 10:15 AM

## 2024-10-08 NOTE — ANESTHESIA PRE PROCEDURE
01:59 PM    RDW 12.0 10/04/2024 01:59 PM     10/04/2024 01:59 PM       CMP:   Lab Results   Component Value Date/Time     10/04/2024 01:59 PM    K 3.8 10/04/2024 01:59 PM     10/04/2024 01:59 PM    CO2 28 10/04/2024 01:59 PM    BUN 21 10/04/2024 01:59 PM    CREATININE 0.66 10/04/2024 01:59 PM    LABGLOM >90 10/04/2024 01:59 PM    GLUCOSE 91 10/04/2024 01:59 PM    CALCIUM 8.9 10/04/2024 01:59 PM    BILITOT 0.6 10/04/2024 01:59 PM    ALKPHOS 74 10/04/2024 01:59 PM    AST 17 10/04/2024 01:59 PM    ALT 34 10/04/2024 01:59 PM       POC Tests: No results for input(s): \"POCGLU\", \"POCNA\", \"POCK\", \"POCCL\", \"POCBUN\", \"POCHEMO\", \"POCHCT\" in the last 72 hours.    Coags: No results found for: \"PROTIME\", \"INR\", \"APTT\"    HCG (If Applicable):   Lab Results   Component Value Date    PREGTESTUR Negative 10/08/2024        ABGs: No results found for: \"PHART\", \"PO2ART\", \"ARY4LMW\", \"OVI6CCX\", \"BEART\", \"S6RMDQUO\"     Type & Screen (If Applicable):  No results found for: \"ABORH\", \"LABANTI\"    Drug/Infectious Status (If Applicable):  No results found for: \"HIV\", \"HEPCAB\"    COVID-19 Screening (If Applicable): No results found for: \"COVID19\"        Anesthesia Evaluation  Patient summary reviewed and Nursing notes reviewed  Airway: Mallampati: II  TM distance: >3 FB   Neck ROM: full  Mouth opening: > = 3 FB   Dental: normal exam         Pulmonary:Negative Pulmonary ROS breath sounds clear to auscultation                             Cardiovascular:Negative CV ROS  Exercise tolerance: good (>4 METS)          Rhythm: regular  Rate: normal                    Neuro/Psych:   (+) psychiatric history (OCD):             ROS comment: Fibromyalgia GI/Hepatic/Renal: Neg GI/Hepatic/Renal ROS            Endo/Other: Negative Endo/Other ROS                    Abdominal:             Vascular: negative vascular ROS.         Other Findings:         Anesthesia Plan      general     ASA 2       Induction: intravenous.    MIPS: Postoperative

## 2024-10-08 NOTE — ANESTHESIA POSTPROCEDURE EVALUATION
Department of Anesthesiology  Postprocedure Note    Patient: Natali Lares  MRN: [id number]  YOB: 1995  Date of evaluation: 10/8/2024    Procedure Summary       Date: 10/08/24 Room / Location: [facility] MAIN [address] MAIN OR    Anesthesia Start: 0802 Anesthesia Stop: 1026    Procedure: L4 - L5 LAMINECTOMY AND DISCECTOMY; L5 - S1 LAMINOTOMY AND DISCECTOMY (Spine Lumbar) Diagnosis:       Herniated nucleus pulposus, L4-5      Herniated nucleus pulposus, L5-S1      (Herniated nucleus pulposus, L4-5 [M51.26])      (Herniated nucleus pulposus, L5-S1 [M51.27])    Providers: Maria Esther Moran MD Responsible Provider: Dawood Stockton MD    Anesthesia Type: General ASA Status: 2            Anesthesia Type: General    Rashard Phase I: Rashard Score: 10    Rashard Phase II:      Anesthesia Post Evaluation    Patient location during evaluation: PACU  Patient participation: complete - patient participated  Level of consciousness: awake  Airway patency: patent  Nausea & Vomiting: no nausea  Cardiovascular status: blood pressure returned to baseline and hemodynamically stable  Respiratory status: acceptable  Hydration status: stable  Multimodal analgesia pain management approach    No notable events documented.

## 2024-10-08 NOTE — PLAN OF CARE
Problem: Physical Therapy - Adult  Goal: By Discharge: Performs mobility at highest level of function for planned discharge setting.  See evaluation for individualized goals.  Description: FUNCTIONAL STATUS PRIOR TO ADMISSION: Patient was independent and active without use of DME. Patient does endorse more difficulty with mobility recently due to pain but did not require assistive devices or physical assistance.    HOME SUPPORT PRIOR TO ADMISSION: The patient lived with her  but did not require assistance.    Physical Therapy Goals  Initiated 10/8/2024  1.  Patient will move from supine to sit and sit to supine and roll side to side in bed with modified independence within 4 day(s).    2.  Patient will perform sit to stand with modified independence within 4 day(s).  3.  Patient will transfer from bed to chair and chair to bed with modified independence using the least restrictive device within 4 day(s).  4.  Patient will ambulate with modified independence for 250 feet with the least restrictive device within 4 day(s).   5.  Patient will ascend/descend 12 stairs with handrail(s) with modified independence within 4 day(s).  6. Patient will verbalize and demonstrate understanding of spinal precautions (No bending, lifting greater than 5 lbs, or twisting; log-roll technique; frequent repositioning as instructed) within 4 days.   Outcome: Progressing   PHYSICAL THERAPY EVALUATION    Patient: Natali Lares (28 y.o. female)  Date: 10/8/2024  Primary Diagnosis: Herniated nucleus pulposus, L4-5 [M51.26]  Herniated nucleus pulposus, L5-S1 [M51.27]  S/P laminectomy [Z98.890]  Herniated lumbar intervertebral disc [M51.26]  Procedure(s) (LRB):  L4 - L5 LAMINECTOMY AND DISCECTOMY; L5 - S1 LAMINOTOMY AND DISCECTOMY (N/A) Day of Surgery   Precautions: Restrictions/Precautions: Fall Risk, Surgical Protocols         Spinal Precautions: No Bending, No Lifting, No Twisting            ASSESSMENT :   DEFICITS/IMPAIRMENTS:   The

## 2024-10-08 NOTE — FLOWSHEET NOTE
10/08/24 0845   Family Communication    Relationship to Patient Spouse    Phone Number Balwinder Lares [phone]   Family/Significant Other Update Called;Updated   Delivery Origin Nurse   Message Disposition Family present - message delivered   Update Given Yes   Family Communication   Family Update Message Procedure started;Surgeon working

## 2024-10-09 VITALS
TEMPERATURE: 98.1 F | RESPIRATION RATE: 14 BRPM | DIASTOLIC BLOOD PRESSURE: 61 MMHG | HEART RATE: 81 BPM | SYSTOLIC BLOOD PRESSURE: 102 MMHG | OXYGEN SATURATION: 98 %

## 2024-10-09 PROCEDURE — 6370000000 HC RX 637 (ALT 250 FOR IP): Performed by: PHYSICIAN ASSISTANT

## 2024-10-09 PROCEDURE — 97530 THERAPEUTIC ACTIVITIES: CPT

## 2024-10-09 PROCEDURE — 6370000000 HC RX 637 (ALT 250 FOR IP): Performed by: NEUROLOGICAL SURGERY

## 2024-10-09 PROCEDURE — 2580000003 HC RX 258: Performed by: NEUROLOGICAL SURGERY

## 2024-10-09 PROCEDURE — 6360000002 HC RX W HCPCS: Performed by: NEUROLOGICAL SURGERY

## 2024-10-09 PROCEDURE — G0378 HOSPITAL OBSERVATION PER HR: HCPCS

## 2024-10-09 PROCEDURE — 99024 POSTOP FOLLOW-UP VISIT: CPT | Performed by: PHYSICIAN ASSISTANT

## 2024-10-09 PROCEDURE — 97535 SELF CARE MNGMENT TRAINING: CPT

## 2024-10-09 PROCEDURE — 97165 OT EVAL LOW COMPLEX 30 MIN: CPT

## 2024-10-09 PROCEDURE — 97116 GAIT TRAINING THERAPY: CPT

## 2024-10-09 RX ORDER — OXYCODONE HYDROCHLORIDE 5 MG/1
5 TABLET ORAL EVERY 4 HOURS PRN
Qty: 20 TABLET | Refills: 0 | Status: SHIPPED | OUTPATIENT
Start: 2024-10-09 | End: 2024-10-16

## 2024-10-09 RX ORDER — SENNA AND DOCUSATE SODIUM 50; 8.6 MG/1; MG/1
1 TABLET, FILM COATED ORAL 2 TIMES DAILY
Qty: 30 TABLET | Refills: 0 | Status: SHIPPED | OUTPATIENT
Start: 2024-10-09

## 2024-10-09 RX ADMIN — GABAPENTIN 300 MG: 300 CAPSULE ORAL at 13:43

## 2024-10-09 RX ADMIN — OXYCODONE 5 MG: 5 TABLET ORAL at 02:15

## 2024-10-09 RX ADMIN — HYOSCYAMINE SULFATE 0.12 MG: 0.12 TABLET SUBLINGUAL at 09:17

## 2024-10-09 RX ADMIN — BUPROPION HYDROCHLORIDE 100 MG: 100 TABLET, FILM COATED, EXTENDED RELEASE ORAL at 06:22

## 2024-10-09 RX ADMIN — ACETAMINOPHEN 650 MG: 325 TABLET ORAL at 09:16

## 2024-10-09 RX ADMIN — OXYCODONE 5 MG: 5 TABLET ORAL at 11:27

## 2024-10-09 RX ADMIN — SENNOSIDES AND DOCUSATE SODIUM 1 TABLET: 50; 8.6 TABLET ORAL at 09:16

## 2024-10-09 RX ADMIN — WATER 2000 MG: 1 INJECTION INTRAMUSCULAR; INTRAVENOUS; SUBCUTANEOUS at 00:58

## 2024-10-09 RX ADMIN — OXYCODONE 5 MG: 5 TABLET ORAL at 06:30

## 2024-10-09 RX ADMIN — GABAPENTIN 300 MG: 300 CAPSULE ORAL at 09:16

## 2024-10-09 ASSESSMENT — PAIN DESCRIPTION - DESCRIPTORS
DESCRIPTORS: ACHING;DISCOMFORT;SHARP
DESCRIPTORS: ACHING;DISCOMFORT

## 2024-10-09 ASSESSMENT — PAIN SCALES - GENERAL
PAINLEVEL_OUTOF10: 6
PAINLEVEL_OUTOF10: 5
PAINLEVEL_OUTOF10: 6
PAINLEVEL_OUTOF10: 0

## 2024-10-09 ASSESSMENT — PAIN - FUNCTIONAL ASSESSMENT
PAIN_FUNCTIONAL_ASSESSMENT: ACTIVITIES ARE NOT PREVENTED
PAIN_FUNCTIONAL_ASSESSMENT: ACTIVITIES ARE NOT PREVENTED

## 2024-10-09 ASSESSMENT — PAIN DESCRIPTION - ORIENTATION
ORIENTATION: POSTERIOR
ORIENTATION: POSTERIOR

## 2024-10-09 ASSESSMENT — PAIN DESCRIPTION - LOCATION
LOCATION: BACK
LOCATION: BACK

## 2024-10-09 NOTE — PLAN OF CARE
Problem: Physical Therapy - Adult  Goal: By Discharge: Performs mobility at highest level of function for planned discharge setting.  See evaluation for individualized goals.  Description: FUNCTIONAL STATUS PRIOR TO ADMISSION: Patient was independent and active without use of DME. Patient does endorse more difficulty with mobility recently due to pain but did not require assistive devices or physical assistance.    HOME SUPPORT PRIOR TO ADMISSION: The patient lived with her  but did not require assistance.    Physical Therapy Goals  Initiated 10/8/2024  1.  Patient will move from supine to sit and sit to supine and roll side to side in bed with modified independence within 4 day(s).    2.  Patient will perform sit to stand with modified independence within 4 day(s).  3.  Patient will transfer from bed to chair and chair to bed with modified independence using the least restrictive device within 4 day(s).  4.  Patient will ambulate with modified independence for 250 feet with the least restrictive device within 4 day(s).   5.  Patient will ascend/descend 12 stairs with handrail(s) with modified independence within 4 day(s).  6. Patient will verbalize and demonstrate understanding of spinal precautions (No bending, lifting greater than 5 lbs, or twisting; log-roll technique; frequent repositioning as instructed) within 4 days.   Outcome: Progressing   PHYSICAL THERAPY TREATMENT-MORNING SESSION    Patient: Natali Lares (28 y.o. female)  Date: 10/9/2024  Diagnosis: Herniated nucleus pulposus, L4-5 [M51.26]  Herniated nucleus pulposus, L5-S1 [M51.27]  S/P laminectomy [Z98.890]  Herniated lumbar intervertebral disc [M51.26] S/P laminectomy  Procedure(s) (LRB):  L4 - L5 LAMINECTOMY AND DISCECTOMY; L5 - S1 LAMINOTOMY AND DISCECTOMY (N/A) 1 Day Post-Op  Precautions: Fall Risk, Surgical Protocols         Spinal Precautions: No Bending, No Lifting, No Twisting          ASSESSMENT:  Patient was seen for morning PT

## 2024-10-09 NOTE — DISCHARGE INSTRUCTIONS
After Hospital Care Plan:  Discharge Instructions Lumbar Laminectomy Surgery Dr. Moran    Patient Name: Natali Lares    Date of procedure: 10/8/24  Date of discharge: 10/9/2024    Procedure: Procedure(s):  L4 - L5 LAMINECTOMY AND DISCECTOMY; L5 - S1 LAMINOTOMY AND DISCECTOMY      Follow up appointments  -Follow up with Dr. Moran in 2 weeks.  Call [phone] to make an appointment as soon as you get home from the hospital.    When to call your Spine Surgeon:  -Signs of infection-if your incision is red; continues to have drainage; drainage has a foul odor or if you have a persistent fever over 101 degrees for 24 hours  -Nausea or vomiting, severe headache  -Loss of bowel or bladder function, inability to urinate  -Changes in sensation in your arms or legs (numbness, tingling, loss of color)  -Increased weakness-greater than before your surgery  -Severe pain or pain not relieved by medications  -Signs of a blood clot in your leg-calf pain, tenderness, redness, swelling of lower leg    When to call your Primary Care Physician:  -Concerns about medical conditions such as diabetes, high blood pressure, asthma, congestive heart failure  -Call if blood sugars are elevated, persistent headache or dizziness, coughing or congestion, constipation or diarrhea, burning with urination, abnormal heart rate    When to call 911 and go to the nearest emergency room:  -Acute onset of chest pain, shortness of breath, difficulty breathing    Activity  - You are going home a well person, be as active as possible.  Your only exercise should be walking.  Start with short frequent walks and increase your walking distance each day.  -Limit the amount of time you sit to 20-30 minute intervals.  Sitting for prolonged periods of time will be uncomfortable for you following surgery.  -Do NOT lift anything over 5 pounds  -Do NOT do any straining, twisting or bending  -When you are in bed, you may lay on your back or on either side.  Do NOT

## 2024-10-09 NOTE — PROGRESS NOTES
Spine Surgery Progress Note  Sinai Grider PA-C        Admit Date: 10/8/2024   LOS: 1 day        Daily Progress Note: 10/8/2024    POD:Day of Surgery    S/P: Procedure(s):  L4 - L5 LAMINECTOMY AND DISCECTOMY; L5 - S1 LAMINOTOMY AND DISCECTOMY    Subjective:     Pt doing very well after surgery. She states she feels better than preop. Pain well-controlled. Has not been able to void yet. She denies numbness, tingling, leg pain, nausea, vomiting, headache, and dyspnea. Pt has had SOB with morphine in the past but has tolerated oxycodone recently.   Pt resting comfortably in bed.    Objective:     Vital signs  Temp (24hrs), Av.7 °F (36.5 °C), Min:96.8 °F (36 °C), Max:98.7 °F (37.1 °C)   10/08 07 - 10/08 1900  In: 1000 [I.V.:1000]  Out: -   No intake/output data recorded.    /61   Pulse 96   Temp 97.5 °F (36.4 °C) (Oral)   Resp 16   LMP 2024 (Approximate)   SpO2 97%            Physical Exam:  Gen: No acute distress.   Neuro: A&Ox3. Follows commands. Speech clear. Affect normal.  PÉREZ. Strength 5/5 in UE and LE BL.  Sensation intact.  Gait deferred.  Calves soft and supple; No pain with passive stretch  Skin: Incision C/D/I    24 hour results:    Recent Results (from the past 24 hour(s))   POC Pregnancy Urine Qual    Collection Time: 10/08/24  6:45 AM   Result Value Ref Range    Preg Test, Ur Negative NEG     TYPE AND SCREEN    Collection Time: 10/08/24  7:29 AM   Result Value Ref Range    Crossmatch expiration date 10/11/2024,0603     ABO/Rh O POSITIVE     Antibody Screen NEG           Assessment:     Principal Problem:    S/P laminectomy  Active Problems:    Herniated lumbar intervertebral disc  Resolved Problems:    * No resolved hospital problems. *      Plan:     s/p L4-S1 lami/discectomy  -PT/OT     -Pain control - scheduled tylenol, prn oxycodone, dilaudid   -Benadryl PRN   -Adult diet   -Bladder checks until voiding fully   -Cont home meds as ordered    Readiness for discharge:     [] Vital 
 Spine Surgery Progress Note  Sinai Grider PA-C        Admit Date: 10/8/2024   LOS: 1 day        Daily Progress Note: 10/9/2024    POD:1 Day Post-Op    S/P: Procedure(s):  L4 - L5 LAMINECTOMY AND DISCECTOMY; L5 - S1 LAMINOTOMY AND DISCECTOMY    Subjective:     Pt doing very well after surgery. She states she feels better than preop. Pain well-controlled. Now voiding and passing gas. Ambulating with PT; able to go up and down some stairs without issue. She denies numbness, tingling, leg pain, nausea, vomiting, headache, and dyspnea. Tolerating oxycodone.  Pt resting comfortably in bed.    Objective:     Vital signs  Temp (24hrs), Av.1 °F (36.7 °C), Min:97.5 °F (36.4 °C), Max:98.8 °F (37.1 °C)   No intake/output data recorded.  10/07 1901 - 10/09 0700  In: 1000 [I.V.:1000]  Out: 775 [Urine:700; Drains:75]    /61   Pulse 81   Temp 98.1 °F (36.7 °C) (Oral)   Resp 14   LMP 2024 (Approximate)   SpO2 98%            Physical Exam:  Gen: No acute distress.   Neuro: A&Ox3. Follows commands. Speech clear. Affect normal.  PÉREZ. Strength 5/5 in UE and LE BL.  Sensation intact.  Gait deferred.  Calves soft and supple; No pain with passive stretch  Skin: Incision C/D/I    24 hour results:    No results found for this or any previous visit (from the past 24 hour(s)).         Assessment:     Principal Problem:    S/P laminectomy  Active Problems:    Herniated lumbar intervertebral disc  Resolved Problems:    * No resolved hospital problems. *      Plan:     s/p L4-S1 lami/discectomy  -PT/OT     -Pain control - scheduled tylenol, prn oxycodone   -Benadryl PRN   -Adult diet   -D/c HVAC   -Bladder checks until voiding fully   -Cont home meds as ordered    Readiness for discharge:     [x] Vital Signs stable    [x] + Voiding    [x] Wound intact, drainage minimal    [x] Tolerating PO intake     [x] Cleared by PT (OT if applicable)    [x] Adequate pain control on oral medication alone       Activity: up with 
Occupational Therapy    Orders received, chart reviewed and patient evaluated by occupational therapy, recommend:    No skilled occupational therapy    Recommend with nursing patient to complete as able in order to maintain strength, endurance and independence: OOB to chair 3x/day, ADLs with CGA and performing toileting with CGA assist. Thank you for your assistance.     Full evaluation to follow.     Thank you,  Matthew Kerley, OT    
Patient was discharged with all personal belongings and understanding of discharge paperwork. Patient was reminded of setting up  post-op appointment with surgeon.  
Surgifoam given to sterile field  Ref: [id number]  Lot: [id number]  Exp: 03/11/2028    FLOSEAL 10  mL WAS GIVEN TO THE STERILE FIELD TO BE USED BY MD  REF: [id number]  LOT: [id number]  EXP: 12/26/2025      
education on technique  Education Method: Demonstration;Verbal  Barriers to Learning: None  Education Outcome: Verbalized understanding;Demonstrated understanding    Thank you for this referral.  Matthew Kerley, OT  Minutes: 29    Occupational Therapy Evaluation Charge Determination   History Examination Decision-Making   LOW Complexity : Brief history review  LOW Complexity: 1-3 Performance deficits relating to physical, cognitive, or psychosocial skills that result in activity limitations and/or participation restrictions LOW Complexity: No comorbidities that affect functional and  no verbal  or physical assist needed to complete eval tasks   Based on the above components, the patient evaluation is determined to be of the following complexity level: Low

## 2024-10-09 NOTE — PLAN OF CARE
Problem: Safety - Adult  Goal: Free from fall injury  10/9/2024 1023 by Jenny Appiah, RN  Outcome: Progressing  10/9/2024 0044 by Beth Siddiqui, RN  Outcome: Progressing

## 2024-10-09 NOTE — PLAN OF CARE
Problem: Physical Therapy - Adult  Goal: By Discharge: Performs mobility at highest level of function for planned discharge setting.  See evaluation for individualized goals.  Description: FUNCTIONAL STATUS PRIOR TO ADMISSION: Patient was independent and active without use of DME. Patient does endorse more difficulty with mobility recently due to pain but did not require assistive devices or physical assistance.    HOME SUPPORT PRIOR TO ADMISSION: The patient lived with her  but did not require assistance.    Physical Therapy Goals  Initiated 10/8/2024  1.  Patient will move from supine to sit and sit to supine and roll side to side in bed with modified independence within 4 day(s).    2.  Patient will perform sit to stand with modified independence within 4 day(s).  3.  Patient will transfer from bed to chair and chair to bed with modified independence using the least restrictive device within 4 day(s).  4.  Patient will ambulate with modified independence for 250 feet with the least restrictive device within 4 day(s).   5.  Patient will ascend/descend 12 stairs with handrail(s) with modified independence within 4 day(s).  6. Patient will verbalize and demonstrate understanding of spinal precautions (No bending, lifting greater than 5 lbs, or twisting; log-roll technique; frequent repositioning as instructed) within 4 days.   10/9/2024 1502 by Amada Elaine, PT  Outcome: Progressing  10/9/2024 1244 by Amada Eliane, PT  Outcome: Progressing   PHYSICAL THERAPY TREATMENT    Patient: Natali Lares (28 y.o. female)  Date: 10/9/2024  Diagnosis: Herniated nucleus pulposus, L4-5 [M51.26]  Herniated nucleus pulposus, L5-S1 [M51.27]  S/P laminectomy [Z98.890]  Herniated lumbar intervertebral disc [M51.26] S/P laminectomy  Procedure(s) (LRB):  L4 - L5 LAMINECTOMY AND DISCECTOMY; L5 - S1 LAMINOTOMY AND DISCECTOMY (N/A) 1 Day Post-Op  Precautions: Fall Risk, Surgical Protocols         Spinal Precautions: No

## 2025-02-13 ENCOUNTER — TELEMEDICINE (OUTPATIENT)
Age: 30
End: 2025-02-13

## 2025-02-13 DIAGNOSIS — Z81.8 FAMILY HISTORY OF AUTISM IN SIBLING: ICD-10-CM

## 2025-02-13 DIAGNOSIS — Z13.79 ASHKENAZI JEWISH ANCESTRY REQUIRING POPULATION-SPECIFIC GENETIC SCREENING: Primary | ICD-10-CM

## 2025-02-13 DIAGNOSIS — Z31.69 ENCOUNTER FOR PRECONCEPTION CONSULTATION: ICD-10-CM

## 2025-02-13 NOTE — PROGRESS NOTES
Natali Lares is a 29 y.o. female seen on 02/13/25 in our [place]'s office for preconception genetic counseling regarding Ashkenazi [place]astaf[place] ancestry and a family history of autism. Natali Lares is not currently pregnant; G0. Genetic counseling was performed via Telemedicine today. The patient was unaccompanied to her appointment.    Impression and Recommendations:    - The patient's Ashkenazi Rastaf[place] ancestry was reviewed, including relevant genetic risks and screening and testing options.  - The patient ELECTED to proceed with the expanded 574 pan-ethnic carrier screening panel, which was ordered through [employer] today.  - A saliva kit will be shipped directly to the patient's house for sample collection.  - The patient and her partner's family histories of autism were reviewed, including the 1-5% recurrence risk for second degree relatives.  - The patient's history of OCD was reviewed, including potential risks of medications during a future pregnancy.  - Prenatal genetic screening options for aneuploidy were briefly reviewed today.  - The patient was referred by her OB for genetic counseling only; all other concerns are being managed by her OB's office.  - We are happy to see the patient back as clinically indicated.    Family and medical histories were taken. The following information was discussed with the patient:    Medical History:    Natali is currently on oral contraceptive pills (OCPs). She reports that they do not have an exact timeline for their conception plan, but are hoping to start actively trying to conceive later this year. The patient would be at least 30 should she become pregnant this year. At 30, the risk to have a baby with Down syndrome would be 1 in 741 and the risk to have a baby with any chromosomal aneuploidy would be 1 in 385. The benefits, risks and limitations of non-invasive prenatal testing (NIPT), ultrasonography, CVS and amniocentesis in the diagnosis of fetal aneuploidy

## 2025-03-13 LAB
Lab: ABNORMAL
Lab: POSITIVE
NTRA PANEL NAME: ABNORMAL

## 2025-03-18 ENCOUNTER — TELEPHONE (OUTPATIENT)
Age: 30
End: 2025-03-18

## 2025-03-18 DIAGNOSIS — Z14.8 CARRIER OF GENETIC DISORDER: Primary | ICD-10-CM

## 2025-03-18 DIAGNOSIS — Z13.79 ASHKENAZI JEWISH ANCESTRY REQUIRING POPULATION-SPECIFIC GENETIC SCREENING: ICD-10-CM

## 2025-03-18 NOTE — TELEPHONE ENCOUNTER
I attempted to call the patient, Natali Lares, today over the phone to review her abnormal carrier screening results. The patient did not answer, so I left a voicemail requesting a call back at her convenience.    Amada Aj MS, [facility]  Licensed, Certified Genetic Counselor    
symptoms in the first few months of life. Symptoms include decreased muscle tone (hypotonia), muscle weakness, difficulty feeding, delayed growth, breathing problems, enlarged liver and heart, and sometimes an enlarged tongue. GSD2 progresses quickly and most untreated infants will die within the first year of life. Enzyme replacement therapy may slow down the progression of heart disease and muscle weakness. The non?classic infantile form of GSD2 has symptoms that begin around the age of one and include delayed development, muscle weakness, enlarged heart, and breathing problems. Without treatment, children with this form usually die in early childhood. Symptoms of late?onset GSD2 can begin at any time from childhood to adulthood. Symptoms include progressive muscle weakness and problems with breathing, often leading to the need for wheelchair and breathing machine assistance. This form of the disease progresses more slowly, especially with enzyme?replacement therapy. GSD2 is caused by homozygous, pathogenic variants in the GAA gene. There are many other forms of Glycogen Storage Disease, each caused by mutations in different genes. People who are carriers for GSD2 are not likely to be at increased risk for having a child with these other forms.    The difference between being a carrier and being affected for autosomal recessive disorders was emphasized. For all four of these conditions, if both parents are carriers there would be a 1 in 4 (25%) chance to have an affected child, 1 in 2 (50%) chance to have a child that is a carrier, and 1 in 4 (25%) chance to have a child that is neither affected nor a carrier.     The availability, benefits, risks and limitations of carrier screening for the patient's partner were reviewed in depth. At this time the patient ELECTED to proceed with carrier screening for her partner. She was sent a [employer] message to get her partner's information so an order can be placed through

## 2025-04-15 ENCOUNTER — TELEPHONE (OUTPATIENT)
Age: 30
End: 2025-04-15

## 2025-04-15 NOTE — TELEPHONE ENCOUNTER
I spoke to the patient, Natali Lares, today over the phone to review her partner's, Balwinder, carrier screening results.    Natali previously underwent expanded carrier screening that identified her as a carrier of four conditions: alpha-1-antitrypsin deficiency (A1AD), Biotinidase deficiency (BD), Familial Mediterranean Fever (FMF) and Pompe disease.     Her partner, Balwinder, subsequently underwent expanded carrier screening. His results identified him as a carrier of Biotinidase deficiency, Phenylketonuria (PKU) and Spinocerebellar Ataxia, Autosomal Recessive 10.    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 The lab notes that this particular BTD variant is a mild variant and is NOT expected to result in a disease phenotype when homozygous. Therefore, this couple is not at an increased risk to have a child with phenotypic biotinidase deficiency. If this variant is found in trans (on opposite chromosomes) with a severe pathogenic variant, the individual is expected to develop partial biotinidase deficiency.    Despite the lack of increased risk for phenotypic disease in their offspring, we still briefly reviewed biotinidase deficiency. Biotinidase deficiency (BD) is a highly treatable disease where the body cannot process biotin correctly. If untreated, this can lead to neurologic abnormalities including seizures, hypotonia, ataxia, developmental delay, vision problems, hearing loss and cutaneous abnormalities. Once vision problems, hearing loss and developmental delay occur, they are usually irreversible. BD is caused by mutations in the BTD gene resulting in either a partial or complete biotinidase enzyme deficiency. Treatment consists of daily biotin supplementation. If started early enough, treatment usually prevents the onset of symptoms. BD is on the [place]  Screen. BD occurs in

## (undated) DEVICE — KIT EVAC 400CC DIA1/8IN H PAT 12.5IN 3 SPR RND SHP PVC DRN

## (undated) DEVICE — SPONGE LAP W18XL18IN WHT COT 4 PLY FLD STRUNG RADPQ DISP ST 2 PER PACK

## (undated) DEVICE — BLADE,CARBON-STEEL,11,STRL,DISPOSABLE,TB: Brand: MEDLINE

## (undated) DEVICE — LAMINECTOMY-SMH: Brand: MEDLINE INDUSTRIES, INC.

## (undated) DEVICE — TOOL 14MH30 LEGEND 14CM 3MM: Brand: MIDAS REX ™

## (undated) DEVICE — INTENT OT USE PROVIDES A STERILE INTERFACE BETWEEN THE OPERATING ROOM SURGICAL LAMPS (NON-STERILE) AND THE SURGEON OR STAFF WORKING IN THE STERILE FIELD.: Brand: ASPEN® ALC PLUS LIGHT HANDLE COVER

## (undated) DEVICE — ADHESIVE SKIN CLOSURE TOP 36 CC HI VISC DERMBND MINI

## (undated) DEVICE — SOLUTION IV 250ML 0.9% SOD CHL CLR INJ FLX BG CONT PRT CLSR

## (undated) DEVICE — BIPOLAR IRRIGATOR INTEGRATED TUBING AND BIPOLAR CORD SET, DISPOSABLE

## (undated) DEVICE — SOLUTION IRRIG 1000ML 0.9% SOD CHL USP POUR PLAS BTL

## (undated) DEVICE — DRAPE MICSCP W46XL120IN POLY DRAWSTRAP W STEREO OBS TB AND

## (undated) DEVICE — X-RAY DETECTABLE SPONGES,16 PLY: Brand: VISTEC

## (undated) DEVICE — SUTURE VICRYL + SZ 2-0 L18IN ABSRB UD CP-2 L26MM 1/2 CIR REV VCP762D

## (undated) DEVICE — SUTURE VICRYL SZ 0 L18IN ABSRB VLT L36MM CT-1 1/2 CIR J740D

## (undated) DEVICE — Device

## (undated) DEVICE — GLOVE SURG SZ 65 L12IN FNGR THK94MIL STD WHT LTX FREE

## (undated) DEVICE — DRESSING BORDERED ADH GZ UNIV GEN USE 8INX4IN AND 6INX2IN

## (undated) DEVICE — DRAPE SURG W41XL74IN CLR FULL SZ C ARM 3 ADH POLY STRP E

## (undated) DEVICE — TUBING, SUCTION, 1/4" X 10', STRAIGHT: Brand: MEDLINE

## (undated) DEVICE — FLOSEAL WITH RECOTHROM - 10ML.: Brand: FLOSEAL HEMOSTATIC MATRIX

## (undated) DEVICE — POSITIONER HD REST FOAM CMFRT TCH

## (undated) DEVICE — SUTURE MONOCRYL SZ 4 0 L18IN ABSRB UD PC 5 L19MM 3 8 CIR SGL Y823G

## (undated) DEVICE — BONE WAX WHITE: Brand: BONE WAX WHITE